# Patient Record
(demographics unavailable — no encounter records)

---

## 2024-11-06 NOTE — PHYSICAL EXAM
[Chaperone Present] : A chaperone was present in the examining room during all aspects of the physical examination [[id number]] : A chaperone was present during the pelvic exam. [Appropriately responsive] : appropriately responsive [FreeTextEntry2] : ANNALISA [Alert] : alert [No Acute Distress] : no acute distress [No Lymphadenopathy] : no lymphadenopathy [Soft] : soft [Non-tender] : non-tender [Non-distended] : non-distended [Oriented x3] : oriented x3 [FreeTextEntry6] : No cervical or axillary lymphadenopathy. [Examination Of The Breasts] : a normal appearance [No Masses] : no breast masses were palpable [Labia Majora] : normal [Labia Minora] : normal [Discharge] : a  ~M vaginal discharge was present [Normal] : normal [Uterine Adnexae] : normal

## 2024-11-06 NOTE — HISTORY OF PRESENT ILLNESS
[FreeTextEntry1] :  HPI:   Patient presents for gyn annual   c/o of headaches, pelvic pain, depression prior to menses 1-2 weeks prior. Onset 1 yr. Given Loestrin 1/10. Took meds 3 months, but didn't feel like it worked for her sx  hx of migraines w/ aura; but not recently   Not on contraception prior to having her children   c/o of pressure w/ void.    LMP: 10/25/24    Last pap: 10/2023 neg cytology, neg HPV    Contraception: tubal  Gardasil: uncertain    PMH: migraines w/ aura (blurry prior) PSH: tubal (BS),     ALL: nkda  Sochx: social etoh; denies illicit or tobacco   OBHx:  x 1.  x 1. SAB x 1.      FAMHX:  denies breast, ovarian, uterine cancer Colon cancer: Dad. 62 years ()     ---------------------------------------------------------------------------------------------------------   ASSESSMENT & PLAN:    38 y/o    #gyn annual   -ASCCP guidelines reviewed; pap, not indicated, patient agreeable  -STI screen offered: declines  -encourage pcp/gyn/dermatology care annually -gardasil info   #PMS #pelvic pain #pressure w/ void:  #migraines w/ aura:  -offered POP -offered zoloft; patient declines  -recommend US  -script POP: positive estrogen risk factors  -ucx   #acute vaginitis -vaginitis and gc/c   rto 2-3  month TV US and f/u menses   : Jane [id number]   Dr. Venita Cardenas DO, MPH, FACOG

## 2025-02-13 NOTE — HISTORY OF PRESENT ILLNESS
[FreeTextEntry1] : HPI   Patient presents for US and f/u menses  last visit c/o of headaches, pelvic pain, depression prior to menses 1-2 weeks prior. hx of migraines w/ aura several years ago; however took Loestrin 1/10 w/o issue   She tried Ana Rosa for one month but stopped b/c she didn't feel it was going well, she felt sleepy on the medication   TODAY: She Reports last menses was fine. No sx.   LMP:  1/15/25  Last pap: 10/2023 neg cytology, neg HPV   Contraception: tubal    --------------------------------------------------------------------------------------------------------- ASSESSMENT & PLAN:  2/10/25 US: uterus 9 cm anteverted. EMS 11 mm RO 3.3 cm w/ 1.6 cm CL cyst LO 2.4 cm  Fibroid: 2.4 cm submucosal Fibroid 1.5 cm intramural     37 y/o    #migraines w/ aura #hx of pelvic pressure #hx of PMS #hx of irregular menses  #submucosal fibroid #small ovarian cyst  -discussed fibroid resection if menses become abnormal agian   -US reviewed   rto 3 month f/u menses  and TV US    ; [id number]  and staff  Dr. Venita Cardenas, DO, MPH, FACOG

## 2025-02-13 NOTE — PHYSICAL EXAM
[Appropriately responsive] : appropriately responsive [Alert] : alert [No Acute Distress] : no acute distress [Oriented x3] : oriented x3 V-Y Flap Text: The defect edges were debeveled with a #15 scalpel blade. Given the location of the defect, shape of the defect and the proximity to free margins a V-Y flap was deemed most appropriate. Using a sterile surgical marker, an appropriate advancement flap was drawn incorporating the defect and placing the expected incisions within the relaxed skin tension lines where possible. The area thus outlined was incised deep to adipose tissue with a #15 scalpel blade. The skin margins were undermined to an appropriate distance in all directions utilizing iris scissors. Following this, the designed flap was advanced and carried over into the primary defect and sutured into place.

## 2025-02-13 NOTE — HISTORY OF PRESENT ILLNESS
[FreeTextEntry1] : HPI   Patient presents for US and f/u menses  last visit c/o of headaches, pelvic pain, depression prior to menses 1-2 weeks prior. hx of migraines w/ aura several years ago; however took Loestrin 1/10 w/o issue   She tried Ana Rosa for one month but stopped b/c she didn't feel it was going well, she felt sleepy on the medication   TODAY: She Reports last menses was fine. No sx.   LMP:  1/15/25  Last pap: 10/2023 neg cytology, neg HPV   Contraception: tubal    --------------------------------------------------------------------------------------------------------- ASSESSMENT & PLAN:  2/10/25 US: uterus 9 cm anteverted. EMS 11 mm RO 3.3 cm w/ 1.6 cm CL cyst LO 2.4 cm  Fibroid: 2.4 cm submucosal Fibroid 1.5 cm intramural     39 y/o    #migraines w/ aura #hx of pelvic pressure #hx of PMS #hx of irregular menses  #submucosal fibroid #small ovarian cyst  -discussed fibroid resection if menses become abnormal agian   -US reviewed   rto 3 month f/u menses  and TV US    ; [id number]  and staff  Dr. Venita Cardenas, DO, MPH, FACOG

## 2025-05-12 NOTE — HISTORY OF PRESENT ILLNESS
[FreeTextEntry1] : HPI Presents to f/u menses and for TV US f/u fibroid and ovarian cyst   reports not issue at all w/ last menses  the issues she reported 11/2024 never occurred again    LMP: 4/12/25 Last pap: 10/2023 neg cytology, neg HPV  Contraception: tubal   --------------------------------------------------------------------------------------------------------- ASSESSMENT & PLAN:  5/12/25 US: uterus 10 cm. Normal adnexa. EMS 10 mm. No FF. Fibroids: 2.2 cm and 1.93 cm intramural   2/10/25 US: uterus 9 cm anteverted. EMS 11 mm RO 3.3 cm w/ 1.6 cm CL cyst LO 2.4 cm Fibroid: 2.4 cm submucosal Fibroid 1.5 cm intramural   #fibroids #hx of ovarian cyst -US reviewed w/ patient  -stable fibroids; cyst resolved -EMS appropriate for cycle timing and age rto nov/dec/jan gyn annual or prn    Dr. Venita Cardenas DO, MPH, FACOG : [id number]